# Patient Record
Sex: FEMALE | Race: WHITE | URBAN - METROPOLITAN AREA
[De-identification: names, ages, dates, MRNs, and addresses within clinical notes are randomized per-mention and may not be internally consistent; named-entity substitution may affect disease eponyms.]

---
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
## 2021-05-26 ENCOUNTER — RECORDS - HEALTHEAST (OUTPATIENT)
Dept: ADMINISTRATIVE | Facility: CLINIC | Age: 21
End: 2021-05-26

## 2021-07-01 ENCOUNTER — RECORDS - HEALTHEAST (OUTPATIENT)
Dept: LAB | Facility: CLINIC | Age: 21
End: 2021-07-01

## 2021-07-01 LAB
ALBUMIN SERPL-MCNC: 4.5 G/DL (ref 3.5–5)
ALP SERPL-CCNC: 91 U/L (ref 45–120)
ALT SERPL W P-5'-P-CCNC: 16 U/L (ref 0–45)
ANION GAP SERPL CALCULATED.3IONS-SCNC: 13 MMOL/L (ref 5–18)
AST SERPL W P-5'-P-CCNC: 17 U/L (ref 0–40)
BILIRUB SERPL-MCNC: 0.7 MG/DL (ref 0–1)
BUN SERPL-MCNC: 13 MG/DL (ref 8–22)
CALCIUM SERPL-MCNC: 10 MG/DL (ref 8.5–10.5)
CHLORIDE BLD-SCNC: 108 MMOL/L (ref 98–107)
CHOLEST SERPL-MCNC: 213 MG/DL
CO2 SERPL-SCNC: 19 MMOL/L (ref 22–31)
CREAT SERPL-MCNC: 0.79 MG/DL (ref 0.6–1.1)
FASTING STATUS PATIENT QL REPORTED: ABNORMAL
GFR SERPL CREATININE-BSD FRML MDRD: >60 ML/MIN/1.73M2
GLUCOSE BLD-MCNC: 87 MG/DL (ref 70–125)
HDLC SERPL-MCNC: 70 MG/DL
LDLC SERPL CALC-MCNC: 133 MG/DL
POTASSIUM BLD-SCNC: 4.2 MMOL/L (ref 3.5–5)
PROT SERPL-MCNC: 7.5 G/DL (ref 6–8)
SODIUM SERPL-SCNC: 140 MMOL/L (ref 136–145)
TRIGL SERPL-MCNC: 51 MG/DL
TSH SERPL DL<=0.005 MIU/L-ACNC: 1.53 UIU/ML (ref 0.3–5)

## 2021-07-02 LAB
C TRACH DNA SPEC QL NAA+PROBE: NEGATIVE
SPECIMEN DESCRIPTION: NORMAL

## 2021-07-16 ASSESSMENT — MIFFLIN-ST. JEOR: SCORE: 1295.35

## 2021-07-30 ENCOUNTER — OFFICE VISIT (OUTPATIENT)
Dept: PHARMACY | Facility: PHYSICIAN GROUP | Age: 21
End: 2021-07-30
Payer: COMMERCIAL

## 2021-07-30 DIAGNOSIS — F33.1 MAJOR DEPRESSIVE DISORDER, RECURRENT EPISODE, MODERATE (H): Primary | ICD-10-CM

## 2021-07-30 DIAGNOSIS — G47.9 SLEEP TROUBLE: ICD-10-CM

## 2021-07-30 DIAGNOSIS — F41.1 GENERALIZED ANXIETY DISORDER: ICD-10-CM

## 2021-07-30 PROCEDURE — 99607 MTMS BY PHARM ADDL 15 MIN: CPT | Performed by: PHARMACIST

## 2021-07-30 PROCEDURE — 99605 MTMS BY PHARM NP 15 MIN: CPT | Performed by: PHARMACIST

## 2021-07-30 RX ORDER — BUPROPION HYDROCHLORIDE 300 MG/1
300 TABLET ORAL DAILY
COMMUNITY
Start: 2021-07-01

## 2021-07-30 RX ORDER — TRAZODONE HYDROCHLORIDE 50 MG/1
50 TABLET, FILM COATED ORAL AT BEDTIME
COMMUNITY

## 2021-07-30 RX ORDER — SERTRALINE HYDROCHLORIDE 100 MG/1
100 TABLET, FILM COATED ORAL DAILY
COMMUNITY

## 2021-07-30 NOTE — PATIENT INSTRUCTIONS
Recommendations from today's MTM visit:                                                    MTM (medication therapy management) is a service provided by a clinical pharmacist designed to help you get the most of out of your medicines.   Today we reviewed what your medicines are for, how to know if they are working, that your medicines are safe and how to make your medicine regimen as easy as possible.      1. Cheek swab collected and sample sent to [facility] for processing.  2. Continue to take all of your current medications until we have your results and come up with a plan with your doctor.     3. Once your results are back I will call you to let you know and setup a time for us to talk through the results by phone or in clinic.         Follow-up: Return in about 2 weeks (around 8/13/2021) for review [facility] results and discuss medication options.    It was great to speak with you today.  I value your experience and would be very thankful for your time with providing feedback on our clinic survey. You may receive a survey via email or text message in the next few days.     To schedule another MTM appointment, please call the clinic directly or you may call the MTM scheduling line at [phone] or toll-free at [phone].     My Clinical Pharmacist's contact information:                                                      Please feel free to contact me with any questions or concerns you have.      Tg Krishnamurthy, PharmD, BCACP  Medication Therapy Management Pharmacist  [facility]  Pager: [phone]

## 2021-07-30 NOTE — PROGRESS NOTES
Family updated. Medication Therapy Management (MTM) Encounter    ASSESSMENT:                            Medication Adherence/Access: No issues identified    Depression, Anxiety, Sleep: Patient is continue to struggle with depression and anxiety symptoms despite taking two antidepressant medications. She has not had a long history of medication trials but given her ongoing symptoms and limited medication response it is reasonable to order pharmacogenetic testing to help guide medication selection.  Education Provided:  - Potential impact of pharmacokinetic and pharmacodynamic genetic variation on medication metabolism and action  - Reviewed genes tested  - Reviewed what report will look like  - How information may be helpful in guiding treatment  - How results may be useful when reviewing safety of other medications  - Limitations of test results on individual medication experience and other factors that impact medication selection    Patient Consent Form reviewed with patient, patient provided opportunity to ask questions, and confirmed understanding.      PLAN:                            1. Cheek swab collected and sample sent to [facility] for processing.  2. Continue to take all of your current medications until we have your results and come up with a plan with your doctor.     3. Once your results are back I will call you to let you know and setup a time for us to talk through the results by phone or in clinic.       Follow-up: Return in about 2 weeks (around 8/13/2021) for review [facility] results and discuss medication options.      SUBJECTIVE/OBJECTIVE:                          Kajal Davies is a 20 year old female coming in for an initial visit. She was referred to me from Eleanor Bourne PA-C.      Reason for visit: Depression and anxiety medication management and discuss pharmacogenetic testing.    Allergies/ADRs: None  Past Medical History: Reviewed in chart  Tobacco: She reports that she has never smoked. She has never used  "smokeless tobacco.  Alcohol: 4-8 beverages a week, drinks 3-4 times a week  Caffeine: none    Medication Adherence/Access: no issues reported    Depresion, Anxiety, Sleep:   Sertraline 100 mg once daily in AM  Bupropion  mg once daily in AM  Trazodone 50 mg once daily at bedtime    She continues to struggle with increased depression symptoms. She felt like sertraline did help some when she initially started but then symptoms returned. She added bupropion and increased dose but didn't see much additional benefit with this. She takes trazodone to help with sleep at night and it does help her fall asleep. She is currently experiencing increased night sweats since she increased bupropion dose and wonders if this could be a side effect. She is not experiencing any other side effects that she has noticed.     Therapies Tried and Response: escitalopram, hydroxyzine    Patient's reasons for doing pharmacogenetic testing: she has not had success with multiple medications and her provider is considering a medication change, thinks information would be helpful to guide medication selection  Patient's expectations from test results: give her provider more information on what medication and doses may be good to try   Patient's concerns about test: none  Insurance information: Patient is the primary account vital.       PHQ-9 SCORE 7/1/2021   PHQ-9 Total Score 15       Today's Vitals: BP (!) 146/78 (BP Location: Right arm, Patient Position: Sitting, Cuff Size: Adult Regular)   Pulse 69   Ht 5' 3.75\" (1.619 m)   Wt 120 lb (54.4 kg)   BMI 20.76 kg/m    ----------------      I spent 28 minutes with this patient today. All changes were made via collaborative practice agreement with Eleanor Bourne PA-C. A copy of the visit note was provided to the patient's primary care provider.    The patient was given a summary of these recommendations.     Tg Krishnamurthy, PharmD, [facility]  Medication Therapy Management Pharmacist  [facility]  Pager: [phone]           Medication Therapy Recommendations  Major depressive disorder, recurrent episode, moderate (H)    Current Medication: buPROPion (WELLBUTRIN XL) 300 MG 24 hr tablet   Rationale: Condition refractory to medication - Ineffective medication - Effectiveness   Recommendation: Order Lab - [facility] pharmacogenetic test ordered to help guide medication selection   Status: Accepted per CPA

## 2021-08-04 VITALS
HEIGHT: 64 IN | HEART RATE: 69 BPM | BODY MASS INDEX: 20.49 KG/M2 | SYSTOLIC BLOOD PRESSURE: 146 MMHG | DIASTOLIC BLOOD PRESSURE: 78 MMHG | WEIGHT: 120 LBS

## 2021-08-04 ASSESSMENT — PATIENT HEALTH QUESTIONNAIRE - PHQ9: SUM OF ALL RESPONSES TO PHQ QUESTIONS 1-9: 15

## 2021-08-24 ENCOUNTER — OFFICE VISIT (OUTPATIENT)
Dept: PHARMACY | Facility: PHYSICIAN GROUP | Age: 21
End: 2021-08-24
Payer: COMMERCIAL

## 2021-08-24 DIAGNOSIS — G47.9 SLEEP TROUBLE: ICD-10-CM

## 2021-08-24 DIAGNOSIS — F41.1 GENERALIZED ANXIETY DISORDER: ICD-10-CM

## 2021-08-24 DIAGNOSIS — F33.1 MAJOR DEPRESSIVE DISORDER, RECURRENT EPISODE, MODERATE (H): Primary | ICD-10-CM

## 2021-08-24 PROCEDURE — 99606 MTMS BY PHARM EST 15 MIN: CPT | Performed by: PHARMACIST

## 2021-08-24 PROCEDURE — 99607 MTMS BY PHARM ADDL 15 MIN: CPT | Performed by: PHARMACIST

## 2021-08-24 ASSESSMENT — PATIENT HEALTH QUESTIONNAIRE - PHQ9: SUM OF ALL RESPONSES TO PHQ QUESTIONS 1-9: 17

## 2021-08-24 NOTE — PROGRESS NOTES
Medication Therapy Management (MTM) Encounter    ASSESSMENT:                            Medication Adherence/Access: No issues identified    Depression, Anxiety, Sleep: Reviewed [facility] results with patient.  Discussed that results will not tell us which drugs will work but can give us some insight into dosing and side effects based on individual metabolism of each medication.   Given current and previous therapy, notable results include:     Reduced response of the serotonin transporter gene- Patient has reduced expression of SLC6A4 serotonin transporter   ? The clinical implications of this are not conclusive, but this could be associated with reduced likelihood of and potentially slower response to SSRIs.   ? This polymorphism may impact currently prescribed sertraline   ? This polymorphism may have impacted past SSRI medication trials.   ? She may benefit from using agent with alternative mechanism of action     Intermediate Metabolizer at CYP2D6:     Medications converted to inactive metabolite(s) may have increased serum levels and increased risk for side effects. Lower or less frequent doses or alternative medication may be needed to reduce risk of side effects.     Medications converted to active metabolite(s) may have reduced exposure.      Intermediate Metabolizer at CYP2B6:     Medications converted to inactive metabolite(s) may have increased serum levels and increased risk for side effects. Lower or less frequent doses or alternative medication may be needed to reduce risk of side effects. Medications converted to active metabolite(s) may have reduced exposure.     Rapid Metabolizer at CY:     Active drugs converted to inactive metabolite(s) may have decreased exposure and may result in lower serum levels than expected. Lower plasma concentrations will increase probability of pharmacotherapy failure. May require higher or more frequent doses or consider an alternative drug not predominantly  metabolized by CY     Reduced folic acid metabolism based on MTHFR genotype. This is associated with significantly decreased serum folate levels. L-methylfolate plays a role in neurotransmitter synthesis. Studies have shown that using L-methylfolate alone or in addition to an antidepressant can help reduce depressive symptoms. One study has shown that depressed patients with variation in MTHFR may benefit more from taking L-methylfolate. L-methylfolate doses used in the studies varied, but 15 mg once daily was most common.     Based on patient's results and persistent symptoms, she may benefit from stopping bupropion and transitioning sertraline to an SNRI, like duloxetine. Duloxetine metabolism is likely not impacted by genetic variants since it is metabolized by both CY and CYP2D6 and the variants she has will balance out. She is going to school soon but would like to make transition with planned close follow-up through televisits with provider and pharmacist. She may also benefit from adding folic acid supplement to see if that provides any additional benefit. Reviewed other supplements of interest with patient and mom at their request.     PLAN:                            1. Consider tapering sertraline and bupropion and switching to duloxetine.   Week 1: Decrease bupropion to 150 mg for 1 week, then stop.  Week 2-3: Decrease sertraline to 50 mg daily. Start duloxetine 30 mg every morning.   Week 4: Decrease sertraline to 25 mg daily. Continue duloxetine to 30 mg every morning.  Week 5: Stop sertraline. Increase duloxetine to 60 mg every morning.    2. Start taking a multivitamin with at least 400 mcg folic acid and 1000 units of vitamin D3 a day. If you want to try magnesium, try magnesium oxide 250-500 mg at bedtime.     Follow-up: Return in about 2 weeks (around 2021) for depression check in with Bal. Follow-up with [facility] pharmacist using televisit in 4 weeks. .      SUBJECTIVE/OBJECTIVE:                           Kajal Davies is a 20 year old female coming in for a follow-up visit. She was referred to me from Eleanor Bourne PA-C. Patient was accompanied by her mom during our visit. Today's visit is a follow-up MTM visit from 2021     Reason for visit: Depression and anxiety medication management. Review pharmacogenetic test results.    Allergies/ADRs: None  Past Medical History: Reviewed in chart  Tobacco: She reports that she has never smoked. She has never used smokeless tobacco.  Alcohol: 4-8 beverages / week, drinks 3-4 times a week  Caffeine: none    Medication Adherence/Access: no issues reported    Depression, Anxiety, Sleep:   Current medications:  Sertraline 100 mg once daily in AM  Bupropion  mg once daily in AM  Trazodone 50 mg once daily at bedtime    She continues to struggle with increased depression symptoms. She felt like sertraline did help some when she initially started but then symptoms returned. She added bupropion and increased dose but didn't see much additional benefit with this. She takes trazodone to help with sleep at night and it does help her fall asleep. She is currently experiencing increased night sweats since she increased bupropion dose and wonders if this could be a side effect. She is not experiencing any other side effects that she has noticed.   Previous medications tried: escitalopram, hydroxyzine    Pharmacogenetic Results:  [facility] testing conducted and reported on 2021.     [facility] Results: (see PDF report in chart documents for more detailed results)    Pharmacokinetic Gene Variants     Gene Phenotype Notable Medications Impacted   CY Rapid Metabolizer duloxetine, olanzapine   CYP2B6 Intermediate Metabolizer bupropion   CYP2D6 Intermediate Metabolizer Mental health: fluoxetine, fluvoxamine, paroxetine, duloxetine, venlafaxine, amitriptyline, nortriptyline, venlafaxine, Trintellix, aripiprazole    CV: propranolol, metoprolol,  carvedilol    Pain: codeine, hydrocodone, oxycodone, tramadol       Pharmacodynamic Gene Variants     Gene Phenotype Notable Medications Impacted   SLC6A4 Reduced Response selective serotonin reuptake inhibitors (escitalopram, sertraline, citalopram, fluoxetine, fluvoxamine)   MTHFR Reduced Conversion    WYSQ1Q2 Decreased function Statins          PHQ-9 SCORE 7/1/2021 8/24/2021   PHQ-9 Total Score 15 17         Today's Vitals: There were no vitals taken for this visit.  ----------------    I spent 28 minutes with this patient today. I offer these suggestions for consideration by Eleanor Bourne PA-C. A copy of the visit note was provided to the patient's primary care provider.    The patient was given a summary of these recommendations.     Tg Krishnamurthy, PharmD, [facility]  Medication Therapy Management Pharmacist  [facility]  Pager: [phone]           Medication Therapy Recommendations  Major depressive disorder, recurrent episode, moderate (H)    Current Medication: buPROPion (WELLBUTRIN XL) 300 MG 24 hr tablet   Rationale: Undesirable effect - Adverse medication event - Safety   Recommendation: Discontinue Medication   Status: Contact Provider - Awaiting Response          Current Medication: sertraline (ZOLOFT) 100 MG tablet   Rationale: Condition refractory to medication - Ineffective medication - Effectiveness   Recommendation: Change Medication - DULoxetine 30 MG capsule   Status: Contact Provider - Awaiting Response

## 2021-08-27 NOTE — PATIENT INSTRUCTIONS
Recommendations from today's MTM visit:                                                       I will talk with Eleanor about stopping bupropion and switching sertralin to duloxetine using the following schedule:  Week 1: Decrease bupropion to 150 mg for 1 week, then stop.  Week 2-3: Decrease sertraline to 50 mg daily. Start duloxetine 30 mg every morning.   Week 4: Decrease sertraline to 25 mg daily. Continue duloxetine to 30 mg every morning.  Week 5: Stop sertraline. Increase duloxetine to 60 mg every morning.    Follow-up: Return in about 2 weeks (around 9/7/2021) for depression check in with Bal. Follow-up with MTM pharmacist using televisit in 4 weeks. .    It was great to speak with you today.  I value your experience and would be very thankful for your time with providing feedback on our clinic survey. You may receive a survey via email or text message in the next few days.     To schedule another MTM appointment, please call the clinic directly or you may call the MTM scheduling line at [phone] or toll-free at [phone].     My Clinical Pharmacist's contact information:                                                      Please feel free to contact me with any questions or concerns you have.      Tg Krishnamurthy, PharmD, BCACP  Medication Therapy Management Pharmacist  [facility]  Pager: [phone]

## 2022-08-23 ENCOUNTER — LAB REQUISITION (OUTPATIENT)
Dept: LAB | Facility: CLINIC | Age: 22
End: 2022-08-23

## 2022-08-23 DIAGNOSIS — Z12.4 ENCOUNTER FOR SCREENING FOR MALIGNANT NEOPLASM OF CERVIX: ICD-10-CM

## 2022-08-23 DIAGNOSIS — Z11.3 ENCOUNTER FOR SCREENING FOR INFECTIONS WITH A PREDOMINANTLY SEXUAL MODE OF TRANSMISSION: ICD-10-CM

## 2022-08-23 PROCEDURE — 87491 CHLMYD TRACH DNA AMP PROBE: CPT | Performed by: NURSE PRACTITIONER

## 2022-08-23 PROCEDURE — G0145 SCR C/V CYTO,THINLAYER,RESCR: HCPCS | Performed by: NURSE PRACTITIONER

## 2022-08-24 LAB
C TRACH DNA SPEC QL PROBE+SIG AMP: NEGATIVE
N GONORRHOEA DNA SPEC QL NAA+PROBE: NEGATIVE

## 2022-08-26 LAB
BKR LAB AP GYN ADEQUACY: NORMAL
BKR LAB AP GYN INTERPRETATION: NORMAL
BKR LAB AP HPV REFLEX: NORMAL
BKR LAB AP LMP: NORMAL
BKR LAB AP PREVIOUS ABNL DX: NORMAL
BKR LAB AP PREVIOUS ABNORMAL: NORMAL
PATH REPORT.COMMENTS IMP SPEC: NORMAL
PATH REPORT.COMMENTS IMP SPEC: NORMAL
PATH REPORT.RELEVANT HX SPEC: NORMAL

## 2024-01-29 ENCOUNTER — LAB REQUISITION (OUTPATIENT)
Dept: LAB | Facility: CLINIC | Age: 24
End: 2024-01-29

## 2024-01-29 DIAGNOSIS — Z11.8 ENCOUNTER FOR SCREENING FOR OTHER INFECTIOUS AND PARASITIC DISEASES: ICD-10-CM

## 2024-01-29 PROCEDURE — 87491 CHLMYD TRACH DNA AMP PROBE: CPT | Performed by: FAMILY MEDICINE

## 2024-01-30 LAB — C TRACH DNA SPEC QL NAA+PROBE: NEGATIVE

## 2025-01-29 ENCOUNTER — LAB REQUISITION (OUTPATIENT)
Dept: LAB | Facility: CLINIC | Age: 25
End: 2025-01-29

## 2025-01-29 DIAGNOSIS — R35.0 FREQUENCY OF MICTURITION: ICD-10-CM

## 2025-01-29 PROCEDURE — 82310 ASSAY OF CALCIUM: CPT | Performed by: OBSTETRICS & GYNECOLOGY

## 2025-01-30 LAB
ALBUMIN SERPL BCG-MCNC: 4.7 G/DL (ref 3.5–5.2)
ALP SERPL-CCNC: 80 U/L (ref 40–150)
ALT SERPL W P-5'-P-CCNC: 18 U/L (ref 0–50)
ANION GAP SERPL CALCULATED.3IONS-SCNC: 13 MMOL/L (ref 7–15)
AST SERPL W P-5'-P-CCNC: 23 U/L (ref 0–45)
BILIRUB SERPL-MCNC: 0.3 MG/DL
BUN SERPL-MCNC: 18.3 MG/DL (ref 6–20)
CALCIUM SERPL-MCNC: 10.3 MG/DL (ref 8.8–10.4)
CHLORIDE SERPL-SCNC: 103 MMOL/L (ref 98–107)
CREAT SERPL-MCNC: 0.67 MG/DL (ref 0.51–0.95)
EGFRCR SERPLBLD CKD-EPI 2021: >90 ML/MIN/1.73M2
GLUCOSE SERPL-MCNC: 83 MG/DL (ref 70–99)
HCO3 SERPL-SCNC: 23 MMOL/L (ref 22–29)
POTASSIUM SERPL-SCNC: 4.3 MMOL/L (ref 3.4–5.3)
PROT SERPL-MCNC: 7.8 G/DL (ref 6.4–8.3)
SODIUM SERPL-SCNC: 139 MMOL/L (ref 135–145)